# Patient Record
Sex: FEMALE | Race: WHITE
[De-identification: names, ages, dates, MRNs, and addresses within clinical notes are randomized per-mention and may not be internally consistent; named-entity substitution may affect disease eponyms.]

---

## 2019-01-13 ENCOUNTER — HOSPITAL ENCOUNTER (EMERGENCY)
Dept: HOSPITAL 8 - ED | Age: 44
Discharge: HOME | End: 2019-01-13
Payer: MEDICAID

## 2019-01-13 VITALS — BODY MASS INDEX: 27.51 KG/M2 | HEIGHT: 59 IN | WEIGHT: 136.47 LBS

## 2019-01-13 VITALS — DIASTOLIC BLOOD PRESSURE: 51 MMHG | SYSTOLIC BLOOD PRESSURE: 104 MMHG

## 2019-01-13 DIAGNOSIS — R19.7: ICD-10-CM

## 2019-01-13 DIAGNOSIS — G43.909: Primary | ICD-10-CM

## 2019-01-13 DIAGNOSIS — M54.5: ICD-10-CM

## 2019-01-13 DIAGNOSIS — F17.200: ICD-10-CM

## 2019-01-13 PROCEDURE — 96374 THER/PROPH/DIAG INJ IV PUSH: CPT

## 2019-01-13 PROCEDURE — 96375 TX/PRO/DX INJ NEW DRUG ADDON: CPT

## 2019-01-13 PROCEDURE — 99283 EMERGENCY DEPT VISIT LOW MDM: CPT

## 2019-01-13 NOTE — NUR
Pt to ER for MHA reported to be 8 days in length w/ intermittent nausea. Hx MHA 
& took imitrex w/out relief. Room dimmed for comfort.

## 2019-01-13 NOTE — NUR
IV est, medicated as per emar for 8/10 HA. Placed on continuous pulse ox, room 
darkened, door closed, call light within reach.